# Patient Record
Sex: MALE | Race: BLACK OR AFRICAN AMERICAN | NOT HISPANIC OR LATINO | Employment: UNEMPLOYED | URBAN - METROPOLITAN AREA
[De-identification: names, ages, dates, MRNs, and addresses within clinical notes are randomized per-mention and may not be internally consistent; named-entity substitution may affect disease eponyms.]

---

## 2019-01-21 ENCOUNTER — HOSPITAL ENCOUNTER (EMERGENCY)
Facility: MEDICAL CENTER | Age: 39
End: 2019-01-21
Attending: EMERGENCY MEDICINE
Payer: MEDICARE

## 2019-01-21 VITALS
RESPIRATION RATE: 16 BRPM | WEIGHT: 242.51 LBS | SYSTOLIC BLOOD PRESSURE: 125 MMHG | HEART RATE: 76 BPM | TEMPERATURE: 99 F | BODY MASS INDEX: 32.85 KG/M2 | HEIGHT: 72 IN | DIASTOLIC BLOOD PRESSURE: 83 MMHG | OXYGEN SATURATION: 96 %

## 2019-01-21 DIAGNOSIS — H10.021 OTHER MUCOPURULENT CONJUNCTIVITIS OF RIGHT EYE: ICD-10-CM

## 2019-01-21 PROCEDURE — 99283 EMERGENCY DEPT VISIT LOW MDM: CPT

## 2019-01-21 RX ORDER — ERYTHROMYCIN 5 MG/G
OINTMENT OPHTHALMIC
Status: DISCONTINUED
Start: 2019-01-21 | End: 2019-01-22 | Stop reason: HOSPADM

## 2019-01-21 RX ORDER — PROPARACAINE HYDROCHLORIDE 5 MG/ML
1 SOLUTION/ DROPS OPHTHALMIC ONCE
Status: DISCONTINUED | OUTPATIENT
Start: 2019-01-21 | End: 2019-01-22 | Stop reason: HOSPADM

## 2019-01-21 ASSESSMENT — PAIN SCALES - GENERAL: PAINLEVEL_OUTOF10: 2

## 2019-01-22 NOTE — ED PROVIDER NOTES
ED Provider Note    CHIEF COMPLAINT  Chief Complaint   Patient presents with   • Conjunctivitis     RIGHT, + pain 2/10, + slightly blurred vision, + purulent drainage noted       HPI  Jaiden Bergman is a 38 y.o. male who presents with right eye pain.  The patient states this started earlier today.  He does not wear corrective lenses.  He does have some blurred vision.  He said drainage from his eye as well as significant discomfort.  The patient does not have a headache.  He has not any nausea or vomiting.    REVIEW OF SYSTEMS  No recent rhinorrhea, cough, or fever    PHYSICAL EXAM  VITAL SIGNS: /88   Pulse 81   Temp 36.8 °C (98.2 °F) (Temporal)   Resp 16   Ht 1.829 m (6')   Wt 110 kg (242 lb 8.1 oz)   SpO2 98%   BMI 32.89 kg/m²   In general the patient appears uncomfortable but nontoxic  Ears tympanic membranes intact but nonerythematous, nares no swollen turbinates no rhinorrhea, oropharynx nonerythematous  Neck is supple without adenopathy  Skin no erythema around the right orbit  Eyes pupils are 2 and reactive bilaterally and extraocular motors intact the right eye was evaluated under the slit lamp with fluorescein and there was no uptake.  The patient does have significant conjunctival injection and slight chemosis.  I do not appreciate any foreign bodies.      COURSE & MEDICAL DECISION MAKING  Pertinent Labs & Imaging studies reviewed. (See chart for details)  This a 38-year-old male who presents the [facility] with conjunctivitis.  He does have evidence of a bacterial conjunctivitis with no evidence of a viral process.  Therefore the patient be placed on erythromycin ointment.  The patient will use ointment for 5 days.  He does not have significant improvement in 48 hours he will follow-up with the ophthalmologist on call.  The patient will return to the [facility] if he is acutely worse.    FINAL IMPRESSION  1.  Right conjunctivitis        Disposition  The patient will be  discharged in stable condition      Electronically signed by: Vel Diop, 1/21/2019 10:03 PM

## 2019-01-22 NOTE — ED TRIAGE NOTES
"Jaiden Bergman  38 y.o.  Chief Complaint   Patient presents with   • Conjunctivitis     RIGHT, + pain 2/10, + slightly blurred vision, + purulent drainage noted     Ambulatory to triage with steady gait for above.    States \"I think I have pink eye.\"    Earlier today patient states that he was [place] the park [place].    Patient states that onset was after work. 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  "

## 2019-01-22 NOTE — ED NOTES
Patient cleared for discharge. All instructions provided- verbalizes understanding. Ambulated off unit with steady gait.